# Patient Record
Sex: FEMALE | Race: WHITE | Employment: UNEMPLOYED | URBAN - METROPOLITAN AREA
[De-identification: names, ages, dates, MRNs, and addresses within clinical notes are randomized per-mention and may not be internally consistent; named-entity substitution may affect disease eponyms.]

---

## 2017-07-18 ENCOUNTER — OFFICE VISIT (OUTPATIENT)
Dept: FAMILY MEDICINE CLINIC | Age: 22
End: 2017-07-18

## 2017-07-18 VITALS
SYSTOLIC BLOOD PRESSURE: 126 MMHG | WEIGHT: 160.6 LBS | DIASTOLIC BLOOD PRESSURE: 74 MMHG | HEART RATE: 60 BPM | BODY MASS INDEX: 28.46 KG/M2 | RESPIRATION RATE: 16 BRPM | HEIGHT: 63 IN | OXYGEN SATURATION: 97 % | TEMPERATURE: 97.4 F

## 2017-07-18 DIAGNOSIS — Z00.00 ROUTINE GENERAL MEDICAL EXAMINATION AT A HEALTH CARE FACILITY: Primary | ICD-10-CM

## 2017-07-18 DIAGNOSIS — Z23 NEED FOR HPV VACCINE: ICD-10-CM

## 2017-07-18 PROCEDURE — 99395 PREV VISIT EST AGE 18-39: CPT | Performed by: FAMILY MEDICINE

## 2017-07-18 PROCEDURE — 90471 IMMUNIZATION ADMIN: CPT | Performed by: FAMILY MEDICINE

## 2017-07-18 PROCEDURE — 90651 9VHPV VACCINE 2/3 DOSE IM: CPT | Performed by: FAMILY MEDICINE

## 2019-02-11 ENCOUNTER — OFFICE VISIT (OUTPATIENT)
Dept: FAMILY MEDICINE CLINIC | Age: 24
End: 2019-02-11
Payer: COMMERCIAL

## 2019-02-11 VITALS
DIASTOLIC BLOOD PRESSURE: 74 MMHG | HEART RATE: 62 BPM | BODY MASS INDEX: 27.63 KG/M2 | TEMPERATURE: 96.9 F | RESPIRATION RATE: 20 BRPM | SYSTOLIC BLOOD PRESSURE: 128 MMHG | WEIGHT: 156 LBS | OXYGEN SATURATION: 98 %

## 2019-02-11 DIAGNOSIS — N94.3 PREMENSTRUAL SYNDROME: Primary | ICD-10-CM

## 2019-02-11 DIAGNOSIS — Z13.31 POSITIVE DEPRESSION SCREENING: ICD-10-CM

## 2019-02-11 PROCEDURE — G8431 POS CLIN DEPRES SCRN F/U DOC: HCPCS | Performed by: FAMILY MEDICINE

## 2019-02-11 PROCEDURE — 99214 OFFICE O/P EST MOD 30 MIN: CPT | Performed by: FAMILY MEDICINE

## 2019-02-11 PROCEDURE — G0444 DEPRESSION SCREEN ANNUAL: HCPCS | Performed by: FAMILY MEDICINE

## 2019-02-11 RX ORDER — DROSPIRENONE/ETHINYL ESTRADIOL/LEVOMEFOLATE CALCIUM AND LEVOMEFOLATE CALCIUM 3-0.03(21)
1 KIT ORAL DAILY
Qty: 28 TABLET | Refills: 5 | Status: SHIPPED | OUTPATIENT
Start: 2019-02-11 | End: 2022-03-10 | Stop reason: SDUPTHER

## 2019-02-11 ASSESSMENT — PATIENT HEALTH QUESTIONNAIRE - PHQ9
3. TROUBLE FALLING OR STAYING ASLEEP: 3
2. FEELING DOWN, DEPRESSED OR HOPELESS: 2
6. FEELING BAD ABOUT YOURSELF - OR THAT YOU ARE A FAILURE OR HAVE LET YOURSELF OR YOUR FAMILY DOWN: 1
10. IF YOU CHECKED OFF ANY PROBLEMS, HOW DIFFICULT HAVE THESE PROBLEMS MADE IT FOR YOU TO DO YOUR WORK, TAKE CARE OF THINGS AT HOME, OR GET ALONG WITH OTHER PEOPLE: 3
8. MOVING OR SPEAKING SO SLOWLY THAT OTHER PEOPLE COULD HAVE NOTICED. OR THE OPPOSITE, BEING SO FIGETY OR RESTLESS THAT YOU HAVE BEEN MOVING AROUND A LOT MORE THAN USUAL: 0
4. FEELING TIRED OR HAVING LITTLE ENERGY: 3
9. THOUGHTS THAT YOU WOULD BE BETTER OFF DEAD, OR OF HURTING YOURSELF: 1
1. LITTLE INTEREST OR PLEASURE IN DOING THINGS: 1
7. TROUBLE CONCENTRATING ON THINGS, SUCH AS READING THE NEWSPAPER OR WATCHING TELEVISION: 1
SUM OF ALL RESPONSES TO PHQ QUESTIONS 1-9: 15
SUM OF ALL RESPONSES TO PHQ9 QUESTIONS 1 & 2: 3
SUM OF ALL RESPONSES TO PHQ QUESTIONS 1-9: 15
5. POOR APPETITE OR OVEREATING: 3

## 2021-02-10 ENCOUNTER — OFFICE VISIT (OUTPATIENT)
Dept: FAMILY MEDICINE CLINIC | Age: 26
End: 2021-02-10
Payer: COMMERCIAL

## 2021-02-10 VITALS
HEART RATE: 65 BPM | OXYGEN SATURATION: 99 % | TEMPERATURE: 97.3 F | DIASTOLIC BLOOD PRESSURE: 85 MMHG | BODY MASS INDEX: 25.4 KG/M2 | WEIGHT: 143.4 LBS | SYSTOLIC BLOOD PRESSURE: 134 MMHG | RESPIRATION RATE: 16 BRPM

## 2021-02-10 DIAGNOSIS — F41.9 ANXIETY: ICD-10-CM

## 2021-02-10 DIAGNOSIS — F40.01 PANIC DISORDER WITH AGORAPHOBIA: ICD-10-CM

## 2021-02-10 PROCEDURE — G8427 DOCREV CUR MEDS BY ELIG CLIN: HCPCS | Performed by: FAMILY MEDICINE

## 2021-02-10 PROCEDURE — 99213 OFFICE O/P EST LOW 20 MIN: CPT | Performed by: FAMILY MEDICINE

## 2021-02-10 PROCEDURE — G8484 FLU IMMUNIZE NO ADMIN: HCPCS | Performed by: FAMILY MEDICINE

## 2021-02-10 PROCEDURE — 1036F TOBACCO NON-USER: CPT | Performed by: FAMILY MEDICINE

## 2021-02-10 PROCEDURE — G8419 CALC BMI OUT NRM PARAM NOF/U: HCPCS | Performed by: FAMILY MEDICINE

## 2021-02-10 RX ORDER — FLUOXETINE 10 MG/1
10 CAPSULE ORAL DAILY
Qty: 90 CAPSULE | Refills: 3 | Status: SHIPPED | OUTPATIENT
Start: 2021-02-10

## 2021-02-10 RX ORDER — HYDROXYZINE HYDROCHLORIDE 10 MG/1
10-20 TABLET, FILM COATED ORAL 3 TIMES DAILY PRN
Qty: 30 TABLET | Refills: 0 | Status: SHIPPED | OUTPATIENT
Start: 2021-02-10 | End: 2021-02-20

## 2021-02-10 SDOH — ECONOMIC STABILITY: TRANSPORTATION INSECURITY
IN THE PAST 12 MONTHS, HAS THE LACK OF TRANSPORTATION KEPT YOU FROM MEDICAL APPOINTMENTS OR FROM GETTING MEDICATIONS?: NO

## 2021-02-10 SDOH — ECONOMIC STABILITY: TRANSPORTATION INSECURITY
IN THE PAST 12 MONTHS, HAS LACK OF TRANSPORTATION KEPT YOU FROM MEETINGS, WORK, OR FROM GETTING THINGS NEEDED FOR DAILY LIVING?: NO

## 2021-02-10 SDOH — ECONOMIC STABILITY: FOOD INSECURITY: WITHIN THE PAST 12 MONTHS, YOU WORRIED THAT YOUR FOOD WOULD RUN OUT BEFORE YOU GOT MONEY TO BUY MORE.: NEVER TRUE

## 2021-02-10 ASSESSMENT — COLUMBIA-SUICIDE SEVERITY RATING SCALE - C-SSRS
3. HAVE YOU BEEN THINKING ABOUT HOW YOU MIGHT KILL YOURSELF?: NO
6. HAVE YOU EVER DONE ANYTHING, STARTED TO DO ANYTHING, OR PREPARED TO DO ANYTHING TO END YOUR LIFE?: NO

## 2021-02-10 ASSESSMENT — PATIENT HEALTH QUESTIONNAIRE - PHQ9
8. MOVING OR SPEAKING SO SLOWLY THAT OTHER PEOPLE COULD HAVE NOTICED. OR THE OPPOSITE, BEING SO FIGETY OR RESTLESS THAT YOU HAVE BEEN MOVING AROUND A LOT MORE THAN USUAL: 0
6. FEELING BAD ABOUT YOURSELF - OR THAT YOU ARE A FAILURE OR HAVE LET YOURSELF OR YOUR FAMILY DOWN: 2
4. FEELING TIRED OR HAVING LITTLE ENERGY: 3
SUM OF ALL RESPONSES TO PHQ QUESTIONS 1-9: 16
SUM OF ALL RESPONSES TO PHQ9 QUESTIONS 1 & 2: 4

## 2021-02-10 NOTE — PATIENT INSTRUCTIONS
TAKE ADVANTAGE OF FREE EAP BENEFITS. Life can be unpredictable, and were here to support you wherever it takes you.  [employer] offers various paid and unpaid leaves of absence, including company-paid short-term disability. Long-term disability and life insurance also protect you and your family's financial security. Additionally, you and your family have free unlimited, confidential 24/7 support available through our [address] to speak with licensed counselors, work/life specialists, legal and financial assistance, and more. CONSIDER RESILLIENCE WAI SUCH AS [employer]. RESUME PROZAC (FLUOXETINE). SEND [facility] MESSAGE IN 2-4 WEEKS WITH UPDATED PHQ=9 SCORE ([employer] , OR SEARCH \"PHQ-9). Today the scrore was 17. WILL HOLD OFF ON LABWORK FOR NOW. RECOMMEND F/U IN CLINIC (IN PERSON OR TELEHEALTH IN 1 TO 2 MONTHS. If you ever find yourself contemplating suicide, please reach out to someone through the following resources:    1) In the local area, the crisis number for [facility] is 281-CARE. This crisis line is available 24 hours a day, seven days a week. 2) You could also call The National Suicide Prevention Hotline. By calling 0-989-413-MNUV (3984) youll be connected to a skilled, trained counselor at a crisis center in your area, anytime 24/7.  3) If after hours or during the weekend, you could speak to one of the doctors at [address]. Call [phone] and you will be given the prompts of how to reach the doctor. 4) You can always go to the nearest ER if you feel that you may be in danger to yourself. Consider adding one of the phone numbers to your cell phone and giving it a title such as \"help. \"

## 2021-02-10 NOTE — PROGRESS NOTES
[facility]  Progress Note  Marcos Aguilar DO          Jennifer Evans  1995    02/10/21    Chief Complaint:   Jennifer Evans is a 22 y.o. female who is here for depression        HPI:   Feeling more down. Established here 2015. Prescribed low-dose SSRI which helped her and find that she no longer needed the medication. Works in retail. Based on hourly salary. Finds herself not enjoying pastime of volleyball in the last few months. No longer in indoor volleyball league. Can't recall needing the Atarax I prescribed in 2015. Does feel moments of anxiety. PHQ Scores 2/10/2021 2/11/2019   PHQ2 Score 4 3   PHQ9 Score 17 15     Interpretation of Total Score Depression Severity: 1-4 = Minimal depression, 5-9 = Mild depression, 10-14 = Moderate depression, 15-19 = Moderately severe depression, 20-27 = Severe depression    PHQ-9 Total Score: 17 (2/10/2021 10:23 AM)  Thoughts that you would be better off dead, or of hurting yourself in some way: 1 (2/10/2021 10:23 AM)    CSSRS Last Completed:   (2/10/2021 10:23 AM)      ROS negative for headache, visionchanges, chest pain, shortness of breath, abdominal pain, urinary sx, bowel changes. Past medical, surgical, and social history reviewed. and allergies reviewed. FHx of mental health disease in father. No Known Allergies  Prior to Visit Medications    Medication Sig Taking?  Authorizing Provider   FLUoxetine (PROZAC) 10 MG capsule Take 1 capsule by mouth daily Yes Apolonio Holstein Bingcang, DO   hydrOXYzine (ATARAX) 10 MG tablet Take 1-2 tablets by mouth 3 times daily as needed for Anxiety (anxiety attack) Yes Apolonio Holstein Bingcang, DO   Drospiren-Eth Estrad-Levomefol 3-0.03-0.451 MG TABS Take 1 tablet by mouth daily Yes Apolonio Holstein Bingcang, DO          Vitals:    02/10/21 1025 02/10/21 1034   BP: (!) 148/89 134/85   Pulse: 65    Resp: 16    Temp: 97.3 °F (36.3 °C)    TempSrc: Tympanic    SpO2: 99%    Weight: 143 lb 6.4 oz (65 kg)       Wt 65   Temp 97.3 °F (36.3 °C) (Tympanic)   Resp 16   Wt 143 lb 6.4 oz (65 kg)   LMP 02/07/2021   SpO2 99%   Breastfeeding No   BMI 25.40 kg/m²   Physical Exam  Psychiatric:         Attention and Perception: Attention normal.         Mood and Affect: Mood is depressed. Speech: Speech normal.         Behavior: Behavior normal.         Thought Content: Thought content normal. Thought content does not include homicidal or suicidal plan. Cognition and Memory: Cognition normal.         Judgment: Judgment normal.       GEN: No acute distress,cooperative, well nourished, alert. HEENT: PEERLA, EOMI , normocephalic/atraumatic, external nose appears normal.  External ear is normal.    Neck: soft, supple, no appreciable thyromegaly,mass  CV: No upper extremity edema. Resp:  Breathing comfortably. Psych:normal affect. Neuro: AOx3  Other Pertinent Physical Exam findings: n/a        ASSESSMENT   Diagnosis Orders   1. Anxiety  FLUoxetine (PROZAC) 10 MG capsule   2. Panic disorder with agoraphobia  FLUoxetine (PROZAC) 10 MG capsule    hydrOXYzine (ATARAX) 10 MG tablet       See approach below. I do not sense self-harm. Resuming SSRI makes sense at this time. May need to consider increasing dose from 10mg to 20 mg    PLAN          If applicable, see additional patient information and instructions under \"Patient Instructions. \"    Return for Depression in 4-8 weeks. Patient Instructions   TAKE ADVANTAGE OF FREE EAP BENEFITS. Life can be unpredictable, and were here to support you wherever it takes you.  [employer] offers various paid and unpaid leaves of absence, including company-paid short-term disability. Long-term disability and life insurance also protect you and your family's financial security.  Additionally, you and your family have free unlimited, confidential 24/7 support available through our [address] to speak with licensed counselors, work/life specialists, legal and financial assistance, and more. CONSIDER RESILLIENCE WAI SUCH AS [employer]. RESUME PROZAC (FLUOXETINE). SEND [employer] MESSAGE IN 2-4 WEEKS WITH UPDATED PHQ=9 SCORE ([employer] , OR SEARCH \"PHQ-9). Today the scrore was 17. WILL HOLD OFF ON LABWORK FOR NOW. RECOMMEND F/U IN CLINIC (IN PERSON OR TELEHEALTH IN 1 TO 2 MONTHS. If you ever find yourself contemplating suicide, please reach out to someone through the following resources:    1) In the local area, the crisis number for [facility] is 281-CARE. This crisis line is available 24 hours a day, seven days a week. 2) You could also call The National Suicide Prevention Hotline. By calling 9-047-276-ZXWN (2838) youll be connected to a skilled, trained counselor at a crisis center in your area, anytime 24/7.  3) If after hours or during the weekend, you could speak to one of the doctors at [address]. Call [phone] and you will be given the prompts of how to reach the doctor. 4) You can always go to the nearest ER if you feel that you may be in danger to yourself. Consider adding one of the phone numbers to your cell phone and giving it a title such as \"help. \"              Please note a portion of this chart was generated using dragon dictation software. Although every effort was made to ensure the accuracy of this automated transcription,some errors in transcription may have occurred.

## 2022-03-10 DIAGNOSIS — N94.3 PREMENSTRUAL SYNDROME: ICD-10-CM

## 2022-03-11 RX ORDER — DROSPIRENONE/ETHINYL ESTRADIOL/LEVOMEFOLATE CALCIUM AND LEVOMEFOLATE CALCIUM 3-0.03(21)
1 KIT ORAL DAILY
Qty: 28 TABLET | Refills: 5 | Status: SHIPPED | OUTPATIENT
Start: 2022-03-11

## 2022-03-11 NOTE — TELEPHONE ENCOUNTER
Requested Prescriptions     Pending Prescriptions Disp Refills    Drospiren-Eth Estrad-Levomefol 3-0.03-0.451 MG TABS 28 tablet 5     Sig: Take 1 tablet by mouth daily     Last ov 2/10/21  Last lab 1/30/15